# Patient Record
Sex: MALE | Race: WHITE | Employment: FULL TIME | URBAN - METROPOLITAN AREA
[De-identification: names, ages, dates, MRNs, and addresses within clinical notes are randomized per-mention and may not be internally consistent; named-entity substitution may affect disease eponyms.]

---

## 2018-09-18 ENCOUNTER — APPOINTMENT (OUTPATIENT)
Dept: GENERAL RADIOLOGY | Age: 28
End: 2018-09-18
Attending: STUDENT IN AN ORGANIZED HEALTH CARE EDUCATION/TRAINING PROGRAM
Payer: SELF-PAY

## 2018-09-18 ENCOUNTER — HOSPITAL ENCOUNTER (EMERGENCY)
Age: 28
Discharge: HOME | End: 2018-09-18
Attending: EMERGENCY MEDICINE
Payer: SELF-PAY

## 2018-09-18 VITALS
WEIGHT: 251.54 LBS | TEMPERATURE: 97.7 F | RESPIRATION RATE: 16 BRPM | HEIGHT: 77 IN | DIASTOLIC BLOOD PRESSURE: 78 MMHG | BODY MASS INDEX: 29.7 KG/M2 | HEART RATE: 69 BPM | OXYGEN SATURATION: 99 % | SYSTOLIC BLOOD PRESSURE: 155 MMHG

## 2018-09-18 DIAGNOSIS — M54.6 ACUTE MIDLINE THORACIC BACK PAIN: Primary | ICD-10-CM

## 2018-09-18 PROCEDURE — 99282 EMERGENCY DEPT VISIT SF MDM: CPT

## 2018-09-18 PROCEDURE — 72072 X-RAY EXAM THORAC SPINE 3VWS: CPT

## 2018-09-18 RX ORDER — IBUPROFEN 400 MG/1
800 TABLET ORAL ONCE
Status: DISCONTINUED | OUTPATIENT
Start: 2018-09-18 | End: 2018-09-18 | Stop reason: HOSPADM

## 2018-09-18 RX ORDER — IBUPROFEN 600 MG/1
600 TABLET ORAL
Qty: 24 TAB | Refills: 0 | Status: SHIPPED | OUTPATIENT
Start: 2018-09-18

## 2018-09-18 RX ORDER — ACETAMINOPHEN 500 MG
1000 TABLET ORAL ONCE
Status: DISCONTINUED | OUTPATIENT
Start: 2018-09-18 | End: 2018-09-18 | Stop reason: HOSPADM

## 2018-09-18 NOTE — DISCHARGE INSTRUCTIONS
Back Pain: Care Instructions  Your Care Instructions    Back pain has many possible causes. It is often related to problems with muscles and ligaments of the back. It may also be related to problems with the nerves, discs, or bones of the back. Moving, lifting, standing, sitting, or sleeping in an awkward way can strain the back. Sometimes you don't notice the injury until later. Arthritis is another common cause of back pain. Although it may hurt a lot, back pain usually improves on its own within several weeks. Most people recover in 12 weeks or less. Using good home treatment and being careful not to stress your back can help you feel better sooner. Follow-up care is a key part of your treatment and safety. Be sure to make and go to all appointments, and call your doctor if you are having problems. It's also a good idea to know your test results and keep a list of the medicines you take. How can you care for yourself at home? · Sit or lie in positions that are most comfortable and reduce your pain. Try one of these positions when you lie down:  ¨ Lie on your back with your knees bent and supported by large pillows. ¨ Lie on the floor with your legs on the seat of a sofa or chair. Clevester Sanes on your side with your knees and hips bent and a pillow between your legs. ¨ Lie on your stomach if it does not make pain worse. · Do not sit up in bed, and avoid soft couches and twisted positions. Bed rest can help relieve pain at first, but it delays healing. Avoid bed rest after the first day of back pain. · Change positions every 30 minutes. If you must sit for long periods of time, take breaks from sitting. Get up and walk around, or lie in a comfortable position. · Try using a heating pad on a low or medium setting for 15 to 20 minutes every 2 or 3 hours. Try a warm shower in place of one session with the heating pad. · You can also try an ice pack for 10 to 15 minutes every 2 to 3 hours.  Put a thin cloth between the ice pack and your skin. · Take pain medicines exactly as directed. ¨ If the doctor gave you a prescription medicine for pain, take it as prescribed. ¨ If you are not taking a prescription pain medicine, ask your doctor if you can take an over-the-counter medicine. · Take short walks several times a day. You can start with 5 to 10 minutes, 3 or 4 times a day, and work up to longer walks. Walk on level surfaces and avoid hills and stairs until your back is better. · Return to work and other activities as soon as you can. Continued rest without activity is usually not good for your back. · To prevent future back pain, do exercises to stretch and strengthen your back and stomach. Learn how to use good posture, safe lifting techniques, and proper body mechanics. When should you call for help? Call your doctor now or seek immediate medical care if:    · You have new or worsening numbness in your legs.     · You have new or worsening weakness in your legs. (This could make it hard to stand up.)     · You lose control of your bladder or bowels.    Watch closely for changes in your health, and be sure to contact your doctor if:    · You have a fever, lose weight, or don't feel well.     · You do not get better as expected. Where can you learn more? Go to http://balaji-kristine.info/. Enter O229 in the search box to learn more about \"Back Pain: Care Instructions. \"  Current as of: November 29, 2017  Content Version: 11.7  © 8632-7292 [employer]. Care instructions adapted under license by [employer] (which disclaims liability or warranty for this information). If you have questions about a medical condition or this instruction, always ask your healthcare professional. [place] [address] any warranty or liability for your use of this information.

## 2018-09-18 NOTE — ED PROVIDER NOTES
EMERGENCY DEPARTMENT HISTORY AND PHYSICAL EXAM 
 
 
Date: 9/18/2018 Patient Name: Sunil Lorenzo 
 
History of Presenting Illness Chief Complaint Patient presents with  Back Pain  
  pt reports upper back pain since playing rugby on Saturday, was seen by a chiropractor yesterday and told he had possible fracture History Provided By: Patient HPI: Sunil Lorenzo, 32 y.o. male with no significant PMHx, presents ambulatory to the ED with cc of back pain. Onset Saturday, after he played rugby, does not recall specific inciting event. States he has dull pain with sharp exacebrations with specific movements, nonradiating, no relieving factors. Went to chiropractor. yesterday who got x-rays, called him back yesterday evening and told to go to the ED for management, per patient chiropractor was not sure whether or not he had a fracture. Did not do a manipulation yesterday. Denies other symptoms including chest pain, dyspnea, nausea, vomiting, abdominal pain, numbness, tingling, weakness, urinary retention or incontinence, fecal incontinence. There are no other complaints, changes, or physical findings at this time. PCP: None Current Facility-Administered Medications Medication Dose Route Frequency Provider Last Rate Last Dose  ibuprofen (MOTRIN) tablet 800 mg  800 mg Oral ONCE Gwen Cristhian  acetaminophen (TYLENOL) tablet 1,000 mg  1,000 mg Oral ONCE Gwen Cristhian Current Outpatient Prescriptions Medication Sig Dispense Refill  ibuprofen (MOTRIN) 600 mg tablet Take 1 Tab by mouth every six (6) hours as needed for Pain. 24 Tab 0 Past History Past Medical History: No past medical history on file. Past Surgical History: No past surgical history on file. Family History: No family history on file. Social History: 
Social History Substance Use Topics  Smoking status: Never Smoker  Smokeless tobacco: Not on file  Alcohol use No  
 
 
Allergies: No Known Allergies Review of Systems Review of Systems Constitutional: Negative for chills and fever. Eyes: Negative for photophobia and visual disturbance. Respiratory: Negative for shortness of breath and wheezing. Cardiovascular: Negative for chest pain and leg swelling. Gastrointestinal: Negative for abdominal pain, nausea and vomiting. Genitourinary: Negative for decreased urine volume, dysuria, frequency and hematuria. Musculoskeletal: Positive for back pain. Negative for neck pain. Skin: Negative for rash. Neurological: Negative for dizziness, weakness, numbness and headaches. Physical Exam  
Physical Exam  
Constitutional: He appears well-developed and well-nourished. No distress. HENT:  
Head: Normocephalic. Eyes: Pupils are equal, round, and reactive to light. Right eye exhibits no discharge. Left eye exhibits no discharge. Neck: No JVD present. No tracheal deviation present. No cervical spine tenderness or stepoffs Cardiovascular: Normal rate and regular rhythm. Exam reveals no gallop and no friction rub. No murmur heard. Pulmonary/Chest: No respiratory distress. He has no wheezes. He has no rales. Abdominal: He exhibits no distension. There is no tenderness. There is no rebound and no guarding. Musculoskeletal:  
TTP thoracic midline without stepoffs Neurological:  
Normal speech, moving all extremities, strength 5/5 all extremities,   
Skin: Skin is warm and dry. No rash noted. Psychiatric: His behavior is normal.  
 
 
Diagnostic Study Results Labs - No results found for this or any previous visit (from the past 12 hour(s)). Radiologic Studies -  
XR SPINE THORAC 3 V Final Result Indication: Upper back pain since playing rugby 4 days ago 
  
AP, lateral, and swimmers views of the thoracic spine demonstrate normal 
alignment without evidence of acute fracture or subluxation. 
  
IMPRESSION Impression: No acute process CT Results  (Last 48 hours) None CXR Results  (Last 48 hours) None Medical Decision Making I am the first provider for this patient. I reviewed the vital signs, available nursing notes, past medical history, past surgical history, family history and social history. Vital Signs-Reviewed the patient's vital signs. Patient Vitals for the past 12 hrs: 
 Temp Pulse Resp BP SpO2  
09/18/18 0930 97.7 °F (36.5 °C) 69 16 155/78 99 % Pulse Oximetry Analysis - 99% on RA Cardiac Monitor:  
Rate: 69 bpm 
Rhythm: Normal Sinus Rhythm Records Reviewed: Nursing Notes and Old Medical Records Provider Notes (Medical Decision Making):  
32year old male presenting with acute thoracic back pain. Afebrils, VSS, no alarm symptoms, no focal deficits. Likely muscular. Will get XR to rule out fracture, symptom control, reassess. ED Course:  
Initial assessment performed. The patients presenting problems have been discussed, and they are in agreement with the care plan formulated and outlined with them. I have encouraged them to ask questions as they arise throughout their visit. 10:24 AM 
No acute fracture on plain film. Prior to discharge, discussed PT and stretching to help pt with his symptoms. Pt expresses his understanding. Disposition: 
DISCHARGE NOTE: 
10:24 AM 
The patient is ready for discharge. The patients signs, symptoms, diagnosis, and instructions for discharge have been discussed and the pt has conveyed their understanding. The patient is to follow up as recommended or return to the ER should their symptoms worsen. Plan has been discussed and patient has conveyed their agreement. PLAN: 
1. Current Discharge Medication List  
  
START taking these medications Details  
ibuprofen (MOTRIN) 600 mg tablet Take 1 Tab by mouth every six (6) hours as needed for Pain. Qty: 24 Tab, Refills: 0  
  
  
 
2. Follow-up Information Follow up With Details Comments Contact Info [facility]  If symptoms worsen [address] [facility] 
[phone] Return to ED if worse Diagnosis Clinical Impression: 1. Acute midline thoracic back pain Bautista Dull 9/18/2018

## 2023-12-28 NOTE — ED NOTES
MD has reviewed discharge instructions with the patient. The patient verbalized understanding.
General